# Patient Record
Sex: MALE | Race: WHITE | NOT HISPANIC OR LATINO | Employment: PART TIME | URBAN - METROPOLITAN AREA
[De-identification: names, ages, dates, MRNs, and addresses within clinical notes are randomized per-mention and may not be internally consistent; named-entity substitution may affect disease eponyms.]

---

## 2018-03-19 ENCOUNTER — OFFICE VISIT (OUTPATIENT)
Dept: URGENT CARE | Facility: CLINIC | Age: 23
End: 2018-03-19

## 2018-03-19 VITALS
BODY MASS INDEX: 21.67 KG/M2 | SYSTOLIC BLOOD PRESSURE: 120 MMHG | DIASTOLIC BLOOD PRESSURE: 76 MMHG | WEIGHT: 160 LBS | HEART RATE: 92 BPM | HEIGHT: 72 IN | OXYGEN SATURATION: 96 % | TEMPERATURE: 98.6 F

## 2018-03-19 DIAGNOSIS — J45.901 ASTHMA WITH ACUTE EXACERBATION, UNSPECIFIED ASTHMA SEVERITY, UNSPECIFIED WHETHER PERSISTENT: Primary | ICD-10-CM

## 2018-03-19 PROCEDURE — G0382 LEV 3 HOSP TYPE B ED VISIT: HCPCS | Performed by: FAMILY MEDICINE

## 2018-03-19 RX ORDER — ALBUTEROL SULFATE 90 UG/1
2 AEROSOL, METERED RESPIRATORY (INHALATION) EVERY 6 HOURS PRN
Qty: 1 INHALER | Refills: 0 | Status: SHIPPED | OUTPATIENT
Start: 2018-03-19

## 2018-03-19 RX ORDER — FLUTICASONE PROPIONATE 50 MCG
2 SPRAY, SUSPENSION (ML) NASAL DAILY
Qty: 16 G | Refills: 0 | Status: SHIPPED | OUTPATIENT
Start: 2018-03-19

## 2018-03-19 RX ORDER — IPRATROPIUM BROMIDE AND ALBUTEROL SULFATE 2.5; .5 MG/3ML; MG/3ML
3 SOLUTION RESPIRATORY (INHALATION)
COMMUNITY

## 2018-03-19 RX ORDER — PREDNISONE 20 MG/1
TABLET ORAL
Qty: 18 TABLET | Refills: 0
Start: 2018-03-19

## 2018-03-19 NOTE — PROGRESS NOTES
[facility]        NAME: Veda Otoole  is a 24 y o  male  : 1995    MRN: [id number]  DATE: 2018  TIME: 6:29 PM    Assessment and Plan   Asthma with acute exacerbation, unspecified asthma severity, unspecified whether persistent [J45 901]  1  Asthma with acute exacerbation, unspecified asthma severity, unspecified whether persistent  predniSONE 20 mg tablet    albuterol (PROVENTIL HFA,VENTOLIN HFA) 90 mcg/act inhaler    fluticasone (FLONASE) 50 mcg/act nasal spray         Patient Instructions     Patient Instructions   Your symptoms are consistent with acute exacerbation of asthma  Use nebulizer at home as needed  Take prednisone taper as directed with food  Use Flonase nasal spray as directed  Use rescue inhaler as needed  Establish and follow up with a family doctor for re-evaluation of your asthma and consideration of a steroid inhaler  Follow up with PCP in 3-5 days  Proceed to  ER if symptoms worsen  Chief Complaint     Chief Complaint   Patient presents with    Asthma     x1 days          History of Present Illness       Patient presents with complaints of chest congestion cough and exacerbation of asthma symptoms over the last couple months  He states now when he coughs he brings up some yellow and green mucus  He does have a history of asthma but has been well controlled up until a few months ago  He states that dog's , cats  and springtime aggravates his asthma  He does have a nebulizer at home has been using that  He also relates that he does smoke marijuana  He denies any fever or chills  Asthma   He complains of cough and wheezing  Pertinent negatives include no ear pain, rhinorrhea, sore throat or trouble swallowing  His past medical history is significant for asthma  Review of Systems   Review of Systems   Constitutional: Negative  HENT: Positive for congestion   Negative for ear discharge, ear pain, hearing loss, rhinorrhea, sinus pain, sinus pressure, sore throat, tinnitus, trouble swallowing and voice change  Eyes: Negative  Respiratory: Positive for cough and wheezing  Cardiovascular: Negative  Gastrointestinal: Negative  Genitourinary: Negative  Musculoskeletal: Negative  Skin: Negative  Neurological: Negative  Hematological: Negative  Current Medications       Current Outpatient Prescriptions:     ipratropium-albuterol (DUO-NEB) 0 5-2 5 mg/3 mL, Take 3 mL by nebulization every 6 (six) hours, Disp: , Rfl:     albuterol (PROVENTIL HFA,VENTOLIN HFA) 90 mcg/act inhaler, Inhale 2 puffs every 6 (six) hours as needed for wheezing, Disp: 1 Inhaler, Rfl: 0    fluticasone (FLONASE) 50 mcg/act nasal spray, 2 sprays into each nostril daily, Disp: 16 g, Rfl: 0    predniSONE 20 mg tablet, Take 1 tab TID x 3 days 1 tab BID x 3 days 1 tab QD x 3 days PO with food, Disp: 18 tablet, Rfl: 0    Current Allergies     Allergies as of 03/19/2018    (No Known Allergies)            The following portions of the patient's history were reviewed and updated as appropriate: allergies, current medications, past family history, past medical history, past social history, past surgical history and problem list      Past Medical History:   Diagnosis Date    Asthma        History reviewed  No pertinent surgical history  Family History   Problem Relation Age of Onset    Family history unknown: Yes         Medications have been verified  Objective   /76   Pulse 92   Temp 98 6 °F (37 °C) (Tympanic)   Ht 6' (1 829 m)   Wt 72 6 kg (160 lb)   SpO2 96%   BMI 21 70 kg/m²        Physical Exam     Physical Exam   Constitutional: He is oriented to person, place, and time  He appears well-developed  HENT:   Head: Normocephalic  Right Ear: External ear normal    Left Ear: External ear normal    Mouth/Throat: Oropharynx is clear and moist    Eyes: EOM are normal  Pupils are equal, round, and reactive to light     Neck: Normal range of motion  Neck supple  No thyromegaly present  Cardiovascular: Normal rate, regular rhythm, normal heart sounds and intact distal pulses  Pulmonary/Chest: Effort normal    Decreased breath sounds with slight end expiratory wheeze right base  No costal retractions  Abdominal: Soft  Bowel sounds are normal  There is no tenderness  Musculoskeletal: Normal range of motion  Neurological: He is alert and oriented to person, place, and time  He has normal reflexes  Skin: Skin is warm and dry  Psychiatric: He has a normal mood and affect  Vitals reviewed

## 2018-03-19 NOTE — PATIENT INSTRUCTIONS
Your symptoms are consistent with acute exacerbation of asthma  Use nebulizer at home as needed  Take prednisone taper as directed with food  Use Flonase nasal spray as directed  Use rescue inhaler as needed  Establish and follow up with a family doctor for re-evaluation of your asthma and consideration of a steroid inhaler

## 2022-10-08 ENCOUNTER — OFFICE VISIT (OUTPATIENT)
Dept: URGENT CARE | Facility: CLINIC | Age: 27
End: 2022-10-08
Payer: COMMERCIAL

## 2022-10-08 VITALS
DIASTOLIC BLOOD PRESSURE: 56 MMHG | RESPIRATION RATE: 16 BRPM | SYSTOLIC BLOOD PRESSURE: 112 MMHG | TEMPERATURE: 98 F | HEART RATE: 68 BPM | WEIGHT: 170 LBS | HEIGHT: 72 IN | BODY MASS INDEX: 23.03 KG/M2 | OXYGEN SATURATION: 99 %

## 2022-10-08 DIAGNOSIS — Z76.0 ENCOUNTER FOR MEDICATION REFILL: ICD-10-CM

## 2022-10-08 DIAGNOSIS — J45.901 ASTHMA WITH ACUTE EXACERBATION, UNSPECIFIED ASTHMA SEVERITY, UNSPECIFIED WHETHER PERSISTENT: Primary | ICD-10-CM

## 2022-10-08 PROCEDURE — G0382 LEV 3 HOSP TYPE B ED VISIT: HCPCS | Performed by: PHYSICIAN ASSISTANT

## 2022-10-08 PROCEDURE — 99283 EMERGENCY DEPT VISIT LOW MDM: CPT | Performed by: PHYSICIAN ASSISTANT

## 2022-10-08 PROCEDURE — S9083 URGENT CARE CENTER GLOBAL: HCPCS | Performed by: PHYSICIAN ASSISTANT

## 2022-10-08 RX ORDER — ALBUTEROL SULFATE 90 UG/1
2 AEROSOL, METERED RESPIRATORY (INHALATION) EVERY 4 HOURS PRN
Qty: 18 G | Refills: 0 | Status: SHIPPED | OUTPATIENT
Start: 2022-10-08

## 2022-10-08 RX ORDER — ALBUTEROL SULFATE 2.5 MG/3ML
2.5 SOLUTION RESPIRATORY (INHALATION) EVERY 6 HOURS PRN
Qty: 75 ML | Refills: 0 | Status: SHIPPED | OUTPATIENT
Start: 2022-10-08

## 2022-10-08 NOTE — PROGRESS NOTES
[facility]        NAME: Rick Del Valle  is a 28 y o  male  : 1995    MRN: [id number]  DATE: 2022  TIME: 1:08 PM    Assessment and Plan   Asthma with acute exacerbation, unspecified asthma severity, unspecified whether persistent [J45 901]  1  Asthma with acute exacerbation, unspecified asthma severity, unspecified whether persistent  Ambulatory referral to [facility]   2  Encounter for medication refill  albuterol (2 5 mg/3 mL) 0 083 % nebulizer solution    albuterol (Proventil HFA) 90 mcg/act inhaler    Ambulatory referral to [facility]         Patient Instructions       Follow up with PCP in 3-5 days  Proceed to  ER if symptoms worsen  Chief Complaint     Chief Complaint   Patient presents with    needs proventyl inhaler     Pt is allergic to cats and needs a refill on Proventyl so he doesn't miss work         History of Present Illness       Patient is a 78-year-old male with significant past medical history of asthma presents the office requesting refill on his albuterol inhaler and nebulizer solution  States he is currently living with a sister who has a cat he is allergic to cats  He reports some mild cough and wheezing but resolves with his inhaler  He has also been taking a daily Benadryl which helps but makes her drowsy  He denies other symptoms  Review of Systems   Review of Systems   Constitutional: Negative for chills and fever  HENT: Negative for congestion, postnasal drip, rhinorrhea and sore throat  Respiratory: Positive for chest tightness and wheezing  Negative for cough and shortness of breath  Cardiovascular: Negative for chest pain  Gastrointestinal: Negative for abdominal pain, diarrhea, nausea and vomiting  Skin: Negative for rash  Neurological: Negative for headaches           Current Medications       Current Outpatient Medications:     albuterol (2 5 mg/3 mL) 0 083 % nebulizer solution, Take 3 mL (2 5 mg total) by nebulization every 6 (six) hours as needed for wheezing or shortness of breath, Disp: 75 mL, Rfl: 0    albuterol (Proventil HFA) 90 mcg/act inhaler, Inhale 2 puffs every 4 (four) hours as needed for wheezing or shortness of breath, Disp: 18 g, Rfl: 0    ipratropium-albuterol (DUO-NEB) 0 5-2 5 mg/3 mL, Take 3 mL by nebulization every 6 (six) hours, Disp: , Rfl:     fluticasone (FLONASE) 50 mcg/act nasal spray, 2 sprays into each nostril daily, Disp: 16 g, Rfl: 0    predniSONE 20 mg tablet, Take 1 tab TID x 3 days 1 tab BID x 3 days 1 tab QD x 3 days PO with food, Disp: 18 tablet, Rfl: 0    Current Allergies     Allergies as of 10/08/2022    (No Known Allergies)            The following portions of the patient's history were reviewed and updated as appropriate: allergies, current medications, past family history, past medical history, past social history, past surgical history and problem list      Past Medical History:   Diagnosis Date    Asthma        Past Surgical History:   Procedure Laterality Date    NO PAST SURGERIES         Family History   Problem Relation Age of Onset    No Known Problems Mother     No Known Problems Father          Medications have been verified  Objective   /56   Pulse 68   Temp 98 °F (36 7 °C)   Resp 16   Ht 6' (1 829 m)   Wt 77 1 kg (170 lb)   SpO2 99%   BMI 23 06 kg/m²   No LMP for male patient  Physical Exam     Physical Exam  Vitals and nursing note reviewed  Constitutional:       Appearance: Normal appearance  He is well-developed  HENT:      Head: Normocephalic and atraumatic  Right Ear: Tympanic membrane, ear canal and external ear normal       Left Ear: Tympanic membrane, ear canal and external ear normal       Nose: Nose normal       Mouth/Throat:      Pharynx: Uvula midline  Eyes:      General: Lids are normal       Conjunctiva/sclera: Conjunctivae normal       Pupils: Pupils are equal, round, and reactive to light     Cardiovascular: Rate and Rhythm: Normal rate and regular rhythm  Heart sounds: Normal heart sounds  No murmur heard  No friction rub  No gallop  Pulmonary:      Effort: Pulmonary effort is normal       Breath sounds: Normal breath sounds  No stridor  No wheezing or rales  Musculoskeletal:         General: Normal range of motion  Cervical back: Neck supple  Skin:     General: Skin is warm and dry  Capillary Refill: Capillary refill takes less than 2 seconds  Neurological:      Mental Status: He is alert

## 2022-11-10 ENCOUNTER — OFFICE VISIT (OUTPATIENT)
Dept: URGENT CARE | Facility: CLINIC | Age: 27
End: 2022-11-10

## 2022-11-10 VITALS
RESPIRATION RATE: 16 BRPM | BODY MASS INDEX: 23.03 KG/M2 | WEIGHT: 170 LBS | HEIGHT: 72 IN | HEART RATE: 79 BPM | OXYGEN SATURATION: 96 % | SYSTOLIC BLOOD PRESSURE: 124 MMHG | DIASTOLIC BLOOD PRESSURE: 61 MMHG | TEMPERATURE: 96 F

## 2022-11-10 DIAGNOSIS — Z11.3 SCREENING FOR STD (SEXUALLY TRANSMITTED DISEASE): ICD-10-CM

## 2022-11-10 DIAGNOSIS — J45.909 UNCOMPLICATED ASTHMA, UNSPECIFIED ASTHMA SEVERITY, UNSPECIFIED WHETHER PERSISTENT: Primary | ICD-10-CM

## 2022-11-10 RX ORDER — ALBUTEROL SULFATE 90 UG/1
2 AEROSOL, METERED RESPIRATORY (INHALATION) EVERY 4 HOURS PRN
Qty: 18 G | Refills: 1 | Status: SHIPPED | OUTPATIENT
Start: 2022-11-10

## 2022-11-10 RX ORDER — ALBUTEROL SULFATE 2.5 MG/3ML
2.5 SOLUTION RESPIRATORY (INHALATION) EVERY 6 HOURS PRN
Qty: 75 ML | Refills: 0 | Status: SHIPPED | OUTPATIENT
Start: 2022-11-10

## 2022-11-10 NOTE — PROGRESS NOTES
[facility]        NAME: William Simeon is a 28 y o  male  : 1995    MRN: [id number]  DATE: November 10, 2022  TIME: 2:43 PM    Assessment and Plan   Uncomplicated asthma, unspecified asthma severity, unspecified whether persistent [J45 909]  1  Uncomplicated asthma, unspecified asthma severity, unspecified whether persistent  albuterol (Proventil HFA) 90 mcg/act inhaler    albuterol (2 5 mg/3 mL) 0 083 % nebulizer solution   2  Screening for STD (sexually transmitted disease)  Chlamydia/GC amplified DNA by PCR     Discussed with patient testing for STDs  Gonorrhea and chlamydia performed in the office today  Advised further testing for other STDs at STD clinic or family practice  Reinforce the importance of follow-up with family practice for ongoing asthma management  Patient Instructions       Follow up with PCP in 3-5 days  Proceed to  ER if symptoms worsen  Chief Complaint     Chief Complaint   Patient presents with   • Asthma     Increased wheezing related to asthma, pt states he ran out of his inhalers, symptoms have been present for last 24 hours   • STD Check     Possible exposure, pt requesting to be tested; denies any current symptoms         History of Present Illness       Patient is a 39-year-old male with significant past medical history of asthma presents the office requesting a refill on his asthma medications  States he is currently living with his sister who was a CT which she is allergic to  He was seen in the office 1 month ago for same complaint but was never able to follow-up with PCP for refills  States symptoms are controlled with his medications otherwise  He is also requesting testing for STDs  Denies any symptoms or known exposure  Review of Systems   Review of Systems   Constitutional: Negative for chills and fever  HENT: Negative for congestion and sore throat  Respiratory: Positive for cough, chest tightness and wheezing   Negative for shortness of breath  Cardiovascular: Negative for chest pain and palpitations  Gastrointestinal: Negative for abdominal pain, diarrhea, nausea and vomiting  Genitourinary: Negative for dysuria, frequency and hematuria  Musculoskeletal: Negative for myalgias  Neurological: Negative for dizziness, light-headedness and headaches  Current Medications       Current Outpatient Medications:   •  albuterol (2 5 mg/3 mL) 0 083 % nebulizer solution, Take 3 mL (2 5 mg total) by nebulization every 6 (six) hours as needed for wheezing or shortness of breath, Disp: 75 mL, Rfl: 0  •  albuterol (Proventil HFA) 90 mcg/act inhaler, Inhale 2 puffs every 4 (four) hours as needed for wheezing or shortness of breath, Disp: 18 g, Rfl: 1  •  ipratropium-albuterol (DUO-NEB) 0 5-2 5 mg/3 mL, Take 3 mL by nebulization every 6 (six) hours, Disp: , Rfl:   •  fluticasone (FLONASE) 50 mcg/act nasal spray, 2 sprays into each nostril daily (Patient not taking: Reported on 11/10/2022), Disp: 16 g, Rfl: 0  •  predniSONE 20 mg tablet, Take 1 tab TID x 3 days 1 tab BID x 3 days 1 tab QD x 3 days PO with food (Patient not taking: Reported on 11/10/2022), Disp: 18 tablet, Rfl: 0    Current Allergies     Allergies as of 11/10/2022   • (No Known Allergies)            The following portions of the patient's history were reviewed and updated as appropriate: allergies, current medications, past family history, past medical history, past social history, past surgical history and problem list      Past Medical History:   Diagnosis Date   • Asthma        Past Surgical History:   Procedure Laterality Date   • NO PAST SURGERIES         Family History   Problem Relation Age of Onset   • No Known Problems Mother    • No Known Problems Father          Medications have been verified          Objective   /61   Pulse 79   Temp (!) 96 °F (35 6 °C)   Resp 16   Ht 6' (1 829 m)   Wt 77 1 kg (170 lb)   SpO2 96%   BMI 23 06 kg/m²   No LMP for male patient  Physical Exam     Physical Exam  Vitals and nursing note reviewed  Constitutional:       Appearance: Normal appearance  He is well-developed  HENT:      Head: Normocephalic and atraumatic  Right Ear: External ear normal       Left Ear: External ear normal       Nose: Nose normal       Mouth/Throat:      Pharynx: Uvula midline  Eyes:      General: Lids are normal    Cardiovascular:      Rate and Rhythm: Normal rate and regular rhythm  Heart sounds: Normal heart sounds  No murmur heard  No friction rub  No gallop  Pulmonary:      Effort: Pulmonary effort is normal       Breath sounds: Normal breath sounds  No stridor  No wheezing (diffuse but faint), rhonchi or rales  Abdominal:      General: Bowel sounds are normal       Palpations: Abdomen is soft  Tenderness: There is no abdominal tenderness  There is no right CVA tenderness or left CVA tenderness  Musculoskeletal:         General: Normal range of motion  Skin:     General: Skin is warm and dry  Capillary Refill: Capillary refill takes less than 2 seconds  Findings: No rash  Neurological:      Mental Status: He is alert

## 2022-11-11 LAB
C TRACH DNA SPEC QL NAA+PROBE: NEGATIVE
N GONORRHOEA DNA SPEC QL NAA+PROBE: NEGATIVE

## 2022-11-14 ENCOUNTER — TELEPHONE (OUTPATIENT)
Dept: URGENT CARE | Facility: CLINIC | Age: 27
End: 2022-11-14

## 2022-11-14 NOTE — TELEPHONE ENCOUNTER
Left voicemail for patient call office regarding recent test results for gonorrhea chlamydia  Testing and results not discussed  Patient tested negative for both

## 2022-11-17 ENCOUNTER — TELEPHONE (OUTPATIENT)
Dept: URGENT CARE | Facility: CLINIC | Age: 27
End: 2022-11-17

## 2022-11-17 NOTE — TELEPHONE ENCOUNTER
Second attempt:     Left voicemail for patient call office regarding recent test results for gonorrhea chlamydia  Testing and results not discussed  Patient tested negative for both

## 2022-11-22 ENCOUNTER — TELEPHONE (OUTPATIENT)
Dept: URGENT CARE | Facility: CLINIC | Age: 27
End: 2022-11-22

## 2022-11-22 NOTE — TELEPHONE ENCOUNTER
Third attempt:      Left voicemail for patient call office regarding recent test results for gonorrhea chlamydia  Testing and results not discussed   Patient tested negative for both  Letter sent home to address on file

## 2022-11-22 NOTE — LETTER
November 22, 2022     Patient: Frank Brumfield   YOB: 1995   Date of Visit: 11/10/2022       Dear No primary care provider on file :     Frank Brumfield was seen in our urgent care department on 11/10/2022  We tried contacting you multiple times by phone regarding some of your results  Please call [facility] at [phone] and we would be happy to discuss this with you  Thank you                 Sincerely,        Michael Agrawal PA-C

## 2024-03-07 ENCOUNTER — APPOINTMENT (EMERGENCY)
Dept: RADIOLOGY | Facility: HOSPITAL | Age: 29
End: 2024-03-07
Payer: COMMERCIAL

## 2024-03-07 ENCOUNTER — HOSPITAL ENCOUNTER (EMERGENCY)
Facility: HOSPITAL | Age: 29
Discharge: HOME | End: 2024-03-07
Attending: EMERGENCY MEDICINE
Payer: COMMERCIAL

## 2024-03-07 VITALS
DIASTOLIC BLOOD PRESSURE: 65 MMHG | SYSTOLIC BLOOD PRESSURE: 119 MMHG | WEIGHT: 180 LBS | OXYGEN SATURATION: 98 % | HEART RATE: 71 BPM | TEMPERATURE: 97.9 F | BODY MASS INDEX: 24.38 KG/M2 | HEIGHT: 72 IN | RESPIRATION RATE: 21 BRPM

## 2024-03-07 DIAGNOSIS — J45.901 ASTHMA EXACERBATION: Primary | ICD-10-CM

## 2024-03-07 LAB
ALBUMIN SERPL BCP-MCNC: 4.7 G/DL (ref 3.5–5)
ALP SERPL-CCNC: 53 U/L (ref 34–104)
ALT SERPL W P-5'-P-CCNC: 22 U/L (ref 7–52)
ANION GAP SERPL CALCULATED.3IONS-SCNC: 8 MMOL/L
AST SERPL W P-5'-P-CCNC: 21 U/L (ref 13–39)
BASOPHILS # BLD AUTO: 0.05 THOUSANDS/ÂΜL (ref 0–0.1)
BASOPHILS NFR BLD AUTO: 1 % (ref 0–1)
BILIRUB SERPL-MCNC: 0.34 MG/DL (ref 0.2–1)
BUN SERPL-MCNC: 15 MG/DL (ref 5–25)
CALCIUM SERPL-MCNC: 9.1 MG/DL (ref 8.4–10.2)
CHLORIDE SERPL-SCNC: 106 MMOL/L (ref 96–108)
CO2 SERPL-SCNC: 25 MMOL/L (ref 21–32)
CREAT SERPL-MCNC: 0.85 MG/DL (ref 0.6–1.3)
EOSINOPHIL # BLD AUTO: 0.97 THOUSAND/ÂΜL (ref 0–0.61)
EOSINOPHIL NFR BLD AUTO: 10 % (ref 0–6)
ERYTHROCYTE [DISTWIDTH] IN BLOOD BY AUTOMATED COUNT: 13.8 % (ref 11.6–15.1)
FLUAV RNA RESP QL NAA+PROBE: NEGATIVE
FLUBV RNA RESP QL NAA+PROBE: NEGATIVE
GFR SERPL CREATININE-BSD FRML MDRD: 116 ML/MIN/1.73SQ M
GLUCOSE SERPL-MCNC: 85 MG/DL (ref 65–140)
HCT VFR BLD AUTO: 46.5 % (ref 36.5–49.3)
HGB BLD-MCNC: 14.8 G/DL (ref 12–17)
IMM GRANULOCYTES # BLD AUTO: 0.03 THOUSAND/UL (ref 0–0.2)
IMM GRANULOCYTES NFR BLD AUTO: 0 % (ref 0–2)
LYMPHOCYTES # BLD AUTO: 2.96 THOUSANDS/ÂΜL (ref 0.6–4.47)
LYMPHOCYTES NFR BLD AUTO: 29 % (ref 14–44)
MCH RBC QN AUTO: 26.6 PG (ref 26.8–34.3)
MCHC RBC AUTO-ENTMCNC: 31.8 G/DL (ref 31.4–37.4)
MCV RBC AUTO: 84 FL (ref 82–98)
MONOCYTES # BLD AUTO: 0.67 THOUSAND/ÂΜL (ref 0.17–1.22)
MONOCYTES NFR BLD AUTO: 7 % (ref 4–12)
NEUTROPHILS # BLD AUTO: 5.48 THOUSANDS/ÂΜL (ref 1.85–7.62)
NEUTS SEG NFR BLD AUTO: 53 % (ref 43–75)
NRBC BLD AUTO-RTO: 0 /100 WBCS
PLATELET # BLD AUTO: 247 THOUSANDS/UL (ref 149–390)
PMV BLD AUTO: 9.9 FL (ref 8.9–12.7)
POTASSIUM SERPL-SCNC: 3.6 MMOL/L (ref 3.5–5.3)
PROT SERPL-MCNC: 7.5 G/DL (ref 6.4–8.4)
RBC # BLD AUTO: 5.56 MILLION/UL (ref 3.88–5.62)
RSV RNA RESP QL NAA+PROBE: NEGATIVE
SARS-COV-2 RNA RESP QL NAA+PROBE: NEGATIVE
SODIUM SERPL-SCNC: 139 MMOL/L (ref 135–147)
WBC # BLD AUTO: 10.16 THOUSAND/UL (ref 4.31–10.16)

## 2024-03-07 PROCEDURE — 94760 N-INVAS EAR/PLS OXIMETRY 1: CPT

## 2024-03-07 PROCEDURE — 36415 COLL VENOUS BLD VENIPUNCTURE: CPT | Performed by: EMERGENCY MEDICINE

## 2024-03-07 PROCEDURE — 94640 AIRWAY INHALATION TREATMENT: CPT

## 2024-03-07 PROCEDURE — 80053 COMPREHEN METABOLIC PANEL: CPT | Performed by: EMERGENCY MEDICINE

## 2024-03-07 PROCEDURE — 85025 COMPLETE CBC W/AUTO DIFF WBC: CPT | Performed by: EMERGENCY MEDICINE

## 2024-03-07 PROCEDURE — 99285 EMERGENCY DEPT VISIT HI MDM: CPT | Performed by: EMERGENCY MEDICINE

## 2024-03-07 PROCEDURE — 71045 X-RAY EXAM CHEST 1 VIEW: CPT

## 2024-03-07 PROCEDURE — 96374 THER/PROPH/DIAG INJ IV PUSH: CPT

## 2024-03-07 PROCEDURE — 0241U HB NFCT DS VIR RESP RNA 4 TRGT: CPT | Performed by: EMERGENCY MEDICINE

## 2024-03-07 PROCEDURE — 94644 CONT INHLJ TX 1ST HOUR: CPT

## 2024-03-07 PROCEDURE — 99285 EMERGENCY DEPT VISIT HI MDM: CPT

## 2024-03-07 PROCEDURE — 94664 DEMO&/EVAL PT USE INHALER: CPT

## 2024-03-07 RX ORDER — ALBUTEROL SULFATE 90 UG/1
2 AEROSOL, METERED RESPIRATORY (INHALATION) ONCE
Status: COMPLETED | OUTPATIENT
Start: 2024-03-07 | End: 2024-03-07

## 2024-03-07 RX ORDER — ALBUTEROL SULFATE 90 UG/1
2 AEROSOL, METERED RESPIRATORY (INHALATION) EVERY 6 HOURS PRN
Qty: 6.7 G | Refills: 0 | Status: SHIPPED | OUTPATIENT
Start: 2024-03-07

## 2024-03-07 RX ORDER — PREDNISONE 20 MG/1
60 TABLET ORAL DAILY
Qty: 15 TABLET | Refills: 0 | Status: SHIPPED | OUTPATIENT
Start: 2024-03-07 | End: 2024-03-12

## 2024-03-07 RX ORDER — ALBUTEROL SULFATE 2.5 MG/3ML
2.5 SOLUTION RESPIRATORY (INHALATION) EVERY 4 HOURS PRN
Qty: 75 ML | Refills: 0 | Status: SHIPPED | OUTPATIENT
Start: 2024-03-07

## 2024-03-07 RX ORDER — SODIUM CHLORIDE FOR INHALATION 0.9 %
12 VIAL, NEBULIZER (ML) INHALATION ONCE
Status: COMPLETED | OUTPATIENT
Start: 2024-03-07 | End: 2024-03-07

## 2024-03-07 RX ORDER — DEXAMETHASONE SODIUM PHOSPHATE 10 MG/ML
10 INJECTION, SOLUTION INTRAMUSCULAR; INTRAVENOUS ONCE
Status: COMPLETED | OUTPATIENT
Start: 2024-03-07 | End: 2024-03-07

## 2024-03-07 RX ADMIN — IPRATROPIUM BROMIDE 1 MG: 0.5 SOLUTION RESPIRATORY (INHALATION) at 03:02

## 2024-03-07 RX ADMIN — DEXAMETHASONE SODIUM PHOSPHATE 10 MG: 10 INJECTION, SOLUTION INTRAMUSCULAR; INTRAVENOUS at 02:54

## 2024-03-07 RX ADMIN — ALBUTEROL SULFATE 10 MG: 2.5 SOLUTION RESPIRATORY (INHALATION) at 03:02

## 2024-03-07 RX ADMIN — ALBUTEROL SULFATE 2 PUFF: 90 AEROSOL, METERED RESPIRATORY (INHALATION) at 03:56

## 2024-03-07 RX ADMIN — ISODIUM CHLORIDE 12 ML: 0.03 SOLUTION RESPIRATORY (INHALATION) at 03:01
